# Patient Record
Sex: FEMALE | Race: WHITE | Employment: FULL TIME | URBAN - METROPOLITAN AREA
[De-identification: names, ages, dates, MRNs, and addresses within clinical notes are randomized per-mention and may not be internally consistent; named-entity substitution may affect disease eponyms.]

---

## 2022-06-24 NOTE — PROGRESS NOTES
Patient Name:  Deja Blas  Patient :  1960  Patient MRN:  [id number]     Primary Oncologist: Kristal Armando MD  Referring Provider: No primary care provider on file. Date of Service: 2022      Reason for Consult:  Anal cancer      Chief Complaint:  No chief complaint on file. There is no problem list on file for this patient. 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 She is a physician. She has history of LEEP procedure in  for +HPV pap smear, and been followed by Dr Soheila Garza. In  she has negative HIV testing. In 2022 she had discomfort to left lower abdomen. She saw Dr Temi Wilson and has anoscopy with removal of anal papilla on 2022. She was seen by Dr Juan David Christensen who recommended chemo and RT. We went over NCCN guideline and schedule for PET/MRI or PET CT scan. She will follow up with gyn for cervical exam and Pap smear. I offered xeloda and mitomycin vs 5-FU + mitomycin. Since mitomycin is strong vesicant agent, she is agreeable to have picc line. She does not have good peripheral access. I referred to IR for picc line and RT onc for concurrent chemo and RT. We discussed about potential SE of chemotherapy. I scheduled for chemo education. We went over surveillance. Mother has history of HR + breast cancer at 77. Patien is due for mammogram and I recommend yearly mammogram. Maternal aunt has ovarian cancer. We discussed about genetic counseling. Past Medical History:   Viral meningitis/encephalitis in . HPV + Papsmear s/p LEEP procedure in . Past Surgery History:    2006, 2008 and . Tonsillectomy in . Nasal fracture 1975  [place] teeth 2018. Anal cryptectomy/papillectomy 2022    Social History:   She denied smoking history. She drinks 2 oz of merlot at bed time occasionally for sleep.   She has 3 children. Family History: Mother has HR + breast cancer at 77 s/p lumpectomy then mastectomy, 3 years of tamoxifen, declined radiation, recurrent at 68 in spine, declined radiation and  at 67.l  Maternal aunt has ovarian cancer    Not on File    No current outpatient medications on file prior to visit. No current facility-administered medications on file prior to visit. Review of Systems:    Constitutional:  No weight loss, No fever, No chills, No night sweats. Energy level good. Eyes:  No diplopia, No transient or permanent loss of vision, No scotomata. ENT / Mouth:  No epistaxis, No dysphagia, No hoarseness, No oral ulcers, No gingival bleeding. No sore throat, No postnasal drip, No nasal drip, No mouth pain, No sinus pain, No tinnitus, Normal hearing. Cardiovascular:  No chest pain, No palpitations, No syncope, No upper extremity edema, No lower extremity edema, No calf discomfort. Respiratory:  No cough. No hemoptysis, No pleurisy, No wheezing, No dyspnea. Breast:  No breast mass, No pain, No nipple discharge, No change in size, No change in shape. Gastrointestinal:  No abdominal pain, No abdominal cramping, No nausea, No vomiting, No constipation, No diarrhea, No hematochezia, No melena, No jaundice, No dyspepsia, No dysphagia. Urinary:  No dysuria, No hematuria, No urinary incontinence. Gynecological:  No vaginal discharge, No suprapubic pain, No abnormal vaginal bleeding. (Female Patients Only)  Musculoskeletal:  No muscle pain, No swollen joints, No joint redness, No bone pain, No spine tenderness. Skin:  No rash, No nodules, No pruritus, No lesions. Neurologic:  No confusion, No seizures, No syncope, No tremor, No speech change, No headache, No hiccups, No abnormal gait, No sensory changes, No weakness. Psychiatric:  No depression, No anxiety, Concentration normal.  Endocrine:  No polyuria, No polydipsia, No hot flashes, No thyroid symptoms.   Hematologic:  No epistaxis, No gingival bleeding, No petechiae, No ecchymosis. Lymphatic:  No lymphadenopathy, No lymphedema. Allergy / Immunologic:  No eczema, No frequent mucous infections, No frequent respiratory infections, No recurrent urticarial, No frequent skin infections. Vital Signs: There were no vitals taken for this visit. Physical Exam:  CONSTITUTIONAL: awake, alert, cooperative, no apparent distress   EYES: pupils equal, round and reactive to light, sclera clear and conjunctiva normal  ENT: Normocephalic, without obvious abnormality, atraumatic  NECK: supple, symmetrical, no jugular venous distension and no carotid bruits   HEMATOLOGIC/LYMPHATIC: no cervical, supraclavicular or axillary lymphadenopathy   LUNGS: no increased work of breathing and clear to auscultation   CARDIOVASCULAR: regular rate and rhythm, normal S1 and S2, no murmur noted  ABDOMEN: normal bowel sounds x 4, soft, non-distended, non-tender, no masses palpated, no hepatosplenomegaly   MUSCULOSKELETAL: full range of motion noted, tone is normal  NEUROLOGIC: awake, alert, oriented to name, place and time. Motor skills grossly intact. SKIN: Normal skin color, texture, turgor and no jaundice. No ecchymosis.   EXTREMITIES: no LE edema      Labs:  Hematology:  No results found for: WBC, RBC, HGB, HCT, MCV, MCH, MCHC, RDW, PLT, MPV, BANDSPCT, SEGSPCT, EOSRELPCT, BASOPCT, LYMPHOPCT, MONOPCT, BANDABS, SEGSABS, EOSABS, BASOSABS, LYMPHSABS, MONOSABS, DIFFTYPE, ANISOCYTOSIS, POLYCHROM, WBCMORP, PLTM  No results found for: ESR    Chemistry:  No results found for: NA, K, CL, CO2, BUN, CREATININE, GLUCOSE, CALCIUM, PROT, LABALBU, BILITOT, ALKPHOS, AST, ALT, LABGLOM, GFRAA, AGRATIO, GLOB, PHOS, MG, POCCA, POCGLU  No results found for: MMA, LDH, HOMOCYSTEINE  No components found for: LD  No results found for: TSHHS, T4FREE, FT3    Immunology:  No results found for: PROT, SPEP, ALBUMINELP, LABALPH, LABBETA, GAMGLOB  No results found for: KAPPAUVOL, LAMBDAUVOL, KLFLCR  No results found for: B2M    Coagulation Panel:  No results found for: PROTIME, INR, APTT, DDIMER    Anemia Panel:  No results found for: IXUMGBPS03, FOLATE    Tumor Markers:  No results found for: , CEA, , LABCA2, PSA     Observations:  No data recorded     Assessment & Plan:  1. She has anal canal squamous cell carcinoma, HPV +. I ordered PET/MRI vs PET CT scan. I referred to RT onc and she opted to oral Xeloda and mitomcyin D1 and D 29. We discussed potential SE of chemo. We went over surveillance. I referred to IR of picc line placement. She does not have good peripheral access. 2. She will follow up with Dr Jo Pearson for pelvic exam.    3. She will see dermatologist for actinic keratosis to [place]. 4. I remind her about yearly mammogram.  We discussed about potential genetic counseling. RTC in 3 weeks or sooner. All of her questions have been answered for today. I have discussed the above stated plan with the patient and they verbalized understanding and agreed with the plan. Thank you for allowing us to participate in this patient's care.

## 2022-06-29 ENCOUNTER — INITIAL CONSULT (OUTPATIENT)
Dept: ONCOLOGY | Age: 62
End: 2022-06-29
Payer: OTHER GOVERNMENT

## 2022-06-29 ENCOUNTER — HOSPITAL ENCOUNTER (OUTPATIENT)
Dept: INFUSION THERAPY | Age: 62
Discharge: HOME | End: 2022-06-29
Payer: OTHER GOVERNMENT

## 2022-06-29 VITALS
WEIGHT: 161.2 LBS | HEART RATE: 51 BPM | BODY MASS INDEX: 25.3 KG/M2 | HEIGHT: 67 IN | SYSTOLIC BLOOD PRESSURE: 157 MMHG | OXYGEN SATURATION: 99 % | DIASTOLIC BLOOD PRESSURE: 85 MMHG | TEMPERATURE: 97.5 F

## 2022-06-29 DIAGNOSIS — C21.0 ANAL CANCER (HCC): ICD-10-CM

## 2022-06-29 DIAGNOSIS — C21.0 ANAL CANCER (HCC): Primary | ICD-10-CM

## 2022-06-29 LAB
ALBUMIN SERPL-MCNC: 4.6 GM/DL (ref 3.4–5)
ALP BLD-CCNC: 64 IU/L (ref 40–129)
ALT SERPL-CCNC: 15 U/L (ref 10–40)
ANION GAP SERPL CALCULATED.3IONS-SCNC: 9 MMOL/L (ref 4–16)
AST SERPL-CCNC: 20 IU/L (ref 15–37)
BASOPHILS ABSOLUTE: 0 K/CU MM
BASOPHILS RELATIVE PERCENT: 0.5 % (ref 0–1)
BILIRUB SERPL-MCNC: 0.2 MG/DL (ref 0–1)
BUN BLDV-MCNC: 20 MG/DL (ref 6–23)
CALCIUM SERPL-MCNC: 9.4 MG/DL (ref 8.3–10.6)
CHLORIDE BLD-SCNC: 103 MMOL/L (ref 99–110)
CO2: 26 MMOL/L (ref 21–32)
CREAT SERPL-MCNC: 0.7 MG/DL (ref 0.6–1.1)
DIFFERENTIAL TYPE: ABNORMAL
EOSINOPHILS ABSOLUTE: 0.1 K/CU MM
EOSINOPHILS RELATIVE PERCENT: 2.1 % (ref 0–3)
GFR AFRICAN AMERICAN: >60 ML/MIN/1.73M2
GFR NON-AFRICAN AMERICAN: >60 ML/MIN/1.73M2
GLUCOSE BLD-MCNC: 88 MG/DL (ref 70–99)
HCT VFR BLD CALC: 37.5 % (ref 37–47)
HEMOGLOBIN: 12.9 GM/DL (ref 12.5–16)
LYMPHOCYTES ABSOLUTE: 2.5 K/CU MM
LYMPHOCYTES RELATIVE PERCENT: 38.7 % (ref 24–44)
MCH RBC QN AUTO: 32.5 PG (ref 27–31)
MCHC RBC AUTO-ENTMCNC: 34.4 % (ref 32–36)
MCV RBC AUTO: 94.5 FL (ref 78–100)
MONOCYTES ABSOLUTE: 0.4 K/CU MM
MONOCYTES RELATIVE PERCENT: 6.6 % (ref 0–4)
PDW BLD-RTO: 12.5 % (ref 11.7–14.9)
PLATELET # BLD: 254 K/CU MM (ref 140–440)
PMV BLD AUTO: 10.1 FL (ref 7.5–11.1)
POTASSIUM SERPL-SCNC: 4.5 MMOL/L (ref 3.5–5.1)
RBC # BLD: 3.97 M/CU MM (ref 4.2–5.4)
SEGMENTED NEUTROPHILS ABSOLUTE COUNT: 3.4 K/CU MM
SEGMENTED NEUTROPHILS RELATIVE PERCENT: 52.1 % (ref 36–66)
SODIUM BLD-SCNC: 138 MMOL/L (ref 135–145)
TOTAL PROTEIN: 6.5 GM/DL (ref 6.4–8.2)
WBC # BLD: 6.5 K/CU MM (ref 4–10.5)

## 2022-06-29 PROCEDURE — 80053 COMPREHEN METABOLIC PANEL: CPT

## 2022-06-29 PROCEDURE — 99205 OFFICE O/P NEW HI 60 MIN: CPT | Performed by: INTERNAL MEDICINE

## 2022-06-29 PROCEDURE — 85025 COMPLETE CBC W/AUTO DIFF WBC: CPT

## 2022-06-29 PROCEDURE — 36415 COLL VENOUS BLD VENIPUNCTURE: CPT

## 2022-06-29 RX ORDER — CAPECITABINE 500 MG/1
825 TABLET, FILM COATED ORAL 2 TIMES DAILY
Qty: 210 TABLET | Refills: 0 | Status: ACTIVE | OUTPATIENT
Start: 2022-06-29 | End: 2022-08-03

## 2022-06-29 RX ORDER — NAPROXEN SODIUM 220 MG
440 TABLET ORAL 2 TIMES DAILY WITH MEALS
COMMUNITY

## 2022-06-29 RX ORDER — ACETAMINOPHEN 325 MG/1
650 TABLET ORAL EVERY 6 HOURS PRN
COMMUNITY

## 2022-06-29 ASSESSMENT — PATIENT HEALTH QUESTIONNAIRE - PHQ9
SUM OF ALL RESPONSES TO PHQ QUESTIONS 1-9: 0
SUM OF ALL RESPONSES TO PHQ QUESTIONS 1-9: 0
SUM OF ALL RESPONSES TO PHQ9 QUESTIONS 1 & 2: 0
1. LITTLE INTEREST OR PLEASURE IN DOING THINGS: 0
2. FEELING DOWN, DEPRESSED OR HOPELESS: 0
SUM OF ALL RESPONSES TO PHQ QUESTIONS 1-9: 0
SUM OF ALL RESPONSES TO PHQ QUESTIONS 1-9: 0

## 2022-06-29 NOTE — PROGRESS NOTES
[facility] Update    Date: 06/29/22      Patient's medication requires a prior authorization. Status update to follow as soon as possible. Please call us with any questions at [phone] opt.  2.

## 2022-06-29 NOTE — PROGRESS NOTES
MA Rooming Questions  Patient: Chad Morrison  MRN: [id number]    Date: 6/29/2022        NP    5. Did the patient have a depression screening completed today?  Yes    PHQ-9 Total Score: 0 (6/29/2022 10:36 AM)       PHQ-9 Given to (if applicable):               PHQ-9 Score (if applicable):                     [] Positive     [x]  Negative              Does question #9 need addressed (if applicable)                     [] Yes    []  No               Primitivo Presume, CMA

## 2022-06-30 ENCOUNTER — TELEPHONE (OUTPATIENT)
Dept: ONCOLOGY | Age: 62
End: 2022-06-30

## 2022-06-30 NOTE — TELEPHONE ENCOUNTER
Returned patients call at [phone] to provide appointment time and dates for PET and PICC placement. Patient states she rescheduled appointment a pawn viewing on [employer] to accommodate patients timeline. Patient stated concern related to leaving multiple voicemail's without return calls. Emotional support provided, call back policy reinforced. RT NN and NN were notified. Answered patients questions within my scope. More specialized questions remained. Advised patient to address these questions with RT NN and physician at consult. Patient voiced understanding. Denied further needs. Encouraged to call with further needs, given my direct number and NN number.

## 2022-06-30 NOTE — PROGRESS NOTES
Left message for patient to arrive at 0700 at [facility] on 7/5/2022 for her procedure at 0800. Spoke with Kaitlynn from Picc team and gave her patient information. ADDENUM: Just spoke with Shivani Law and she does not want to do the Picc Line on 7/5/2022, she is going to call central scheduling and try to get rescheduled on 7/11/2022. I called Picc team and spoke with Yojana Sharif to let them know.

## 2022-07-05 ENCOUNTER — TELEPHONE (OUTPATIENT)
Dept: RADIATION ONCOLOGY | Age: 62
End: 2022-07-05

## 2022-07-05 ENCOUNTER — HOSPITAL ENCOUNTER (OUTPATIENT)
Dept: SURGERY | Age: 62
Discharge: HOME | End: 2022-07-05

## 2022-07-06 ENCOUNTER — HOSPITAL ENCOUNTER (OUTPATIENT)
Dept: RADIATION ONCOLOGY | Age: 62
Discharge: HOME | End: 2022-07-06
Payer: OTHER GOVERNMENT

## 2022-07-06 VITALS
TEMPERATURE: 98.2 F | WEIGHT: 162.4 LBS | HEART RATE: 56 BPM | RESPIRATION RATE: 16 BRPM | DIASTOLIC BLOOD PRESSURE: 77 MMHG | BODY MASS INDEX: 25.49 KG/M2 | HEIGHT: 67 IN | SYSTOLIC BLOOD PRESSURE: 145 MMHG | OXYGEN SATURATION: 99 %

## 2022-07-06 DIAGNOSIS — C21.1 SQUAMOUS CELL CARCINOMA OF ANAL CANAL (HCC): ICD-10-CM

## 2022-07-06 PROCEDURE — 99205 OFFICE O/P NEW HI 60 MIN: CPT | Performed by: RADIOLOGY

## 2022-07-06 RX ORDER — OXYCODONE HYDROCHLORIDE AND ACETAMINOPHEN 5; 325 MG/1; MG/1
TABLET ORAL
COMMUNITY
Start: 2022-06-17 | End: 2022-07-06

## 2022-07-06 NOTE — PROGRESS NOTES
Elpidio Torrez  2022    CONSULT     Vitals:    22 0828   BP: (!) 145/77   Pulse: 56   Resp: 16   Temp: 98.2 °F (36.8 °C)   SpO2: 99%        Oxygen Therapy  SpO2: 99 %  Pulse Oximeter Device Mode: Intermittent  Pulse Oximeter Device Location: Right,Finger  O2 Device: None (Room air)    Wt Readings from Last 3 Encounters:   22 162 lb 6.4 oz (73.7 kg)   22 161 lb 3.2 oz (73.1 kg)           Denies Need for Intervention    Fall Risk:    Not currently a fall risk  Re-Evaluate Weekly    Nutritional Alteration:  None  No Difficulties Swallowing  Voices Sufficient Oral Intake    Mediport: no    Pacemaker/ICD: no    Implants: no    Previous XRT: no    Assessment: Patient here to discuss radiation treatment options with Dr. Pinky Conley.       Past Medical History:   Diagnosis Date    HPV in female 2019    Meningitis 2017       Past Surgical History:   Procedure Laterality Date    ANUS SURGERY  2022    cryptecteomy/papillectomy     SECTION  , ,     COLONOSCOPY  1995    NOSE SURGERY  1975    TONSILLECTOMY  1968    WISDOM TOOTH EXTRACTION  2018       Allergies   Allergen Reactions    Soy Anaphylaxis    Sulfamethoxazole-Trimethoprim Hives    Nitrofurantoin Rash          Current Outpatient Medications:     Ascorbic Acid (VITAMIN C PO), Take by mouth, Disp: , Rfl:     naproxen sodium (ALEVE) 220 MG tablet, Take 440 mg by mouth 2 times daily (with meals), Disp: , Rfl:     acetaminophen (TYLENOL) 325 MG tablet, Take 650 mg by mouth every 6 hours as needed for Pain, Disp: , Rfl:     capecitabine (XELODA) 500 MG chemo tablet, Take 3 tablets by mouth 2 times daily (Patient not taking: Reported on 2022), Disp: 210 tablet, Rfl: 0        Electronically signed by Gaby Leung CMA on 2022 at 8:30 AM

## 2022-07-06 NOTE — CONSULTS
Radiation Oncology Consultation  Encounter Date: 2022 8:29 AM    Ms. Josue Long is a 63 y.o. female  : 1960  MRN: [id number]  Acct Number: [de-identified]  Requesting Provider: No att. providers found        CONSULTANT: Sandy Barreto MD    PHYSICIANS:   Primary Care: No primary care provider on file. Lavinia Rodríguez M.D.  [address] [facility], [address] [facility]    [place],       DIAGNOSIS:      Cancer Staging  No matching staging information was found for the patient. TREATMENT COURSE:  Oncology History   Squamous cell carcinoma of anal canal ([address]   2022 Initial Diagnosis    Squamous cell carcinoma of anal canal (HCC)           HPI:   Today I had the privilege of seeing Josue Long in consultation. As you recall, Josue Long is a 63 y.o. female who was recently found to have an invasive squamous cell carcinoma of the anus. She was previously in a fairly good state of health, however, she reports that around January of this year, she began experiencing pain with defecation. She reports initially the pain was in the left lower pelvis however subsequently dropped to the perianal region rating a 10 on a scale of 1-10 with 10 being the worst.    She was subsequently referred to Dr. Rosaura Soriano, and On 2022, she underwent excision of what clinically appeared to be hypertrophied anal papilla. However, on pathologic review the specimen, she was noted to have invasive squamous cell carcinoma p16 positive. She was evaluated by Dr. Chuck Harden on 2022 at which time concurrent definitive chemo RT was recommended for local regional disease and PET/CT was scheduled. She presents today for consideration of her treatment options. She otherwise denies any acute symptomatology. She was not experiencing any fevers, chills, or drenching night sweats. Her weight and appetite have been stable. She has not noticed any new lumps or bumps anywhere throughout her body.   She denies new onset of bony pain. Currently, she reports she is healing from the surgery with the pain being up to an 8 on a scale of 1-10 with 10 being the worst but only very briefly and only immediately with bowel movements. She denies any melena or hematochezia. Past Medical History:   Diagnosis Date    HPV in female 2019    Meningitis 2017        Past Surgical History:   Procedure Laterality Date    ANUS SURGERY  2022    cryptecteomy/papillectomy     SECTION  , , 2008   [facility]GERY  [facility]   [facility]    WISDOM TOOTH EXTRACTION  2018       Family History   Problem Relation Age of Onset    Breast Cancer Mother     Cancer Sister        Social History     Socioeconomic History    Marital status:      Spouse name: Not on file    Number of children: Not on file    Years of education: Not on file    Highest education level: Not on file   Occupational History    Not on file   Tobacco Use    Smoking status: Never Smoker    Smokeless tobacco: Never Used   Substance and Sexual Activity    Alcohol use: Not on file    Drug use: Not on file    Sexual activity: Not on file   Other Topics Concern    Not on file   Social History Narrative    Not on file     Social Determinants of Health     Financial Resource Strain:     Difficulty of Paying Living Expenses: Not on file   Food Insecurity:     Worried About Running Out of Food in the Last Year: Not on file    Magi of Food in the Last Year: Not on file   Transportation Needs:     Lack of Transportation (Medical): Not on file    Lack of Transportation (Non-Medical):  Not on file   Physical Activity:     Days of Exercise per Week: Not on file    Minutes of Exercise per Session: Not on file   Stress:     Feeling of Stress : Not on file   Social Connections:     Frequency of Communication with Friends and Family: Not on file    Frequency of Social Gatherings with Friends and Family: Not on file    Attends Faith Services: Not on file    Active Member of Clubs or Organizations: Not on file    Attends Club or Organization Meetings: Not on file    Marital Status: Not on file   Intimate Partner Violence:     Fear of Current or Ex-Partner: Not on file    Emotionally Abused: Not on file    Physically Abused: Not on file    Sexually Abused: Not on file   Housing Stability:     Unable to Pay for Housing in the Last Year: Not on file    Number of [place] in the Last Year: Not on file    Unstable Housing in the Last Year: Not on file           ALLERGIES:   Allergies   Allergen Reactions    Soy Anaphylaxis    Sulfamethoxazole-Trimethoprim Hives    Nitrofurantoin Rash        Current Outpatient Medications   Medication Sig Dispense Refill    Ascorbic Acid (VITAMIN C PO) Take by mouth      naproxen sodium (ALEVE) 220 MG tablet Take 440 mg by mouth 2 times daily (with meals)      acetaminophen (TYLENOL) 325 MG tablet Take 650 mg by mouth every 6 hours as needed for Pain      capecitabine (XELODA) 500 MG chemo tablet Take 3 tablets by mouth 2 times daily (Patient not taking: Reported on 7/6/2022) 210 tablet 0     No current facility-administered medications for this encounter.      Outpatient Medications Marked as Taking for the 7/6/22 encounter [facility]ITAL Encounter) with [facility] ONC NURSE 2   Medication Sig Dispense Refill    Ascorbic Acid (VITAMIN C PO) Take by mouth            LABORATORY STUDIES:   CBC:   Lab Results   Component Value Date/Time    WBC 6.5 06/29/2022 01:07 PM    RBC 3.97 06/29/2022 01:07 PM    HGB 12.9 06/29/2022 01:07 PM    HCT 37.5 06/29/2022 01:07 PM    MCV 94.5 06/29/2022 01:07 PM    MCH 32.5 06/29/2022 01:07 PM    MCHC 34.4 06/29/2022 01:07 PM    RDW 12.5 06/29/2022 01:07 PM     06/29/2022 01:07 PM    MPV 10.1 06/29/2022 01:07 PM     CMP:  Lab Results   Component Value Date/Time     06/29/2022 01:07 PM    K 4.5 06/29/2022 01:07 PM     2022 01:07 PM    CO2 26 2022 01:07 PM    BUN 20 2022 01:07 PM    CREATININE 0.7 2022 01:07 PM    GFRAA >60 2022 01:07 PM    LABGLOM >60 2022 01:07 PM    GLUCOSE 88 2022 01:07 PM    PROT 6.5 2022 01:07 PM    LABALBU 4.6 2022 01:07 PM    CALCIUM 9.4 2022 01:07 PM    BILITOT 0.2 2022 01:07 PM    ALKPHOS 64 2022 01:07 PM    AST 20 2022 01:07 PM    ALT 15 2022 01:07 PM     Onc labs: No results found for: PSA, CEA, LDH, AFP    PATHOLOGY:   Invasive squamous cell carcinoma p16 positive    RADIOLOGIC STUDIES:   PET/CT 2022-scheduled      REVIEW OF SYSTEMS:   Constitutional:  Patient denies fevers, rigors, or drenching night sweats. The patient's weight and appetite have been stable over the past several months. Eyes:  The patient denies any recent visual changes, diplopia, or cataracts  ENMT:  The patient denies any ear pain, hearing changes, or nosebleeds  Respiratory:  The patient denies any SOB, hemoptysis, or green sputum production  Cardiovascular: The patient denies any chest pain, leg edema, or fainting spells  GI:  Patient denies nausea, vomiting, diarrhea, melena, or hematochezia  : Patient denies dysuria, hematuria, or urinary incontinence. Integumentary: patient denies skin pruritis, or arm edema. She does report nonhealing lesions in the proximal right upper extremity over the past 6 months which wax and wane, for which she is planning to see dermatology in the future. Endocrine:  Patient denies any diabetic or thyroid problems  Neurologic: Patient denies headaches, focal weakness, or disorientation  Psychiatric: The patient denies any depression, anxiety, or rapid mood swings. PHYSICAL EXAMINATION:   VITAL SIGNS: There were no vitals taken for this visit. ECOG PERFORMANCE STATUS: 0  PAIN RATIN    GENERAL: Pleasant well-developed adult in no acute distress.   Alert and oriented ×3  SKIN: Warm and dry, without jaundice, ecchymoses, or petechiae. HEENT: Normocephalic, atraumatic. Pupils are round and symmetric. Sclerae anicteric  NECK:  No JVD; Supple. No cervical, supraclavicular, or infraclavicular lymphadenopathy is palpable. LUNGS: Bilaterally clear to auscultation. No rales, rhonci, or wheezing are present. Good inspiratory effort, no accessory muscle use. CARDIAC: Regular rate and rhythm, without murmurs, clicks, or gallops   ABDOMEN: Normoactive bowels sounds are present. Soft. Non-tender and non-distended. No hepatosplenomegaly or masses are present. Digital rectal examination: Induration and edema is present along the left side of the anus immediately inside the sphincter. EXTREMITIES: No cyanosis, clubbing or edema is present. FROM  NEUROLOGIC: Gait unremarkable. The patient is alert and oriented. CN II-XII are grossly intact. Sensation to light touch is intact and symmetric in the fingers and feet. Muscle strength is 5/5 in both the upper and lower extremities. IMPRESSION/PLAN:  Elpidio Torrez is a 63 y.o. female who was recently found to have squamous cell carcinoma of the anal canal p16 positive. I will await her upcoming PET/CT results and review the images from 7/7/2022. Should the PET show no evidence of distant metastasis, the treatment options were discussed in detail with the patient. I would recommend definitive concurrent chemo-radiation therapy. The potential acute side effects and chronic complications of radiation therapy to the pelvis and anus were discussed in detail with the patient. The patient voiced understanding, and the patient's questions were answered. The patient has decided to proceed with radiation therapy, pending the above-mentioned result. I will await PET results prior to scheduling her simulation.     Additional physician time required for IMRT planning for a detailed contour and DVH evaluation to minimize acute and chronic toxicity of the bladder, rectum, and femoral heads. Thank-you for allowing me to participate in the care of this very pleasant patient. MEDICAL DECISION MAKING    Number/Complexity of Problems Addressed  [] 1 self-limited of minor problem (90291/17717)  [] >=2 self-limited or minor problems, 1 stable chronic illness, 1 acute/uncomplicated illness/injury (50045/12025)  []Chronic illnesses with exacerbation/progression/side effects of treatment, >=2 stable chronic illnesses, 1 undiagnosed new problem with uncertain prognosis, 1 acute illness with systemic symptoms, 1 acute complicated injury (05191/35978)  [x]Chronic illnesses with severe exacerbation/progression/treatment side effects, acute or chronic illness or injury that poses a threat to life or bodily function (74602/72465)    Amount and/or Complexity of Data Reviewed  [] Minimal or none (47020/84485)  [] Limited - meets 1/2 categories (18719/27003)  1. At least 2 of: review of prior external notes from each unique source, review results of each unique test, ordering of each unique test  2. Assessment requiring an independent historian  [] Moderate - meets 1/3 categories (40632/34904)  1. Any 3 of: Review of prior external notes from each unique source, review results of each unique test, ordering of each unique test, assessment requiring an independent historian  2. Independent interpretation of tests  3. Discussion of management or test interpretation  [x] Extensive - meets 2/3 categories (29451/95593)  1. Any 3 of: Review of prior external notes from each unique source, review results of each unique test, ordering of each unique test, assessment requiring an independent historian  2. Independent interpretation of tests  3.  Discussion of management or test interpretation    Risk of complications and/or morbidity/mortality of patient management  [] Minimal (37580/57536)  [] Low (10632/05437)  [] Moderate (04467/29241)  Examples: Rx drug management, decision regarding minor surgery with identified patient or procedure risk factors, decision regarding elective major surgery without identified patient or procedure risk factors, diagnosis or treatment significantly limited by social determinants of health  [x] High (51896/32917)  Examples: drug therapy requiring intensive monitoring for toxicity, decision regarding elective major surgery with identified patient/procedure risk factors, decision regarding emergency major surgery, decision regarding hospitalization, decision for DNR or de-escalation of care because of poor prognosis      LEVEL OF MDM (based on 2/3 above categories)  [] Straightforward (34980/72464)  [] Low (17747/52668)  [] Moderate (10991/53282)  [x] High (72007/44121)    Electronically signed by Ary Pagan MD on 7/6/2022 at 8:29 AM

## 2022-07-06 NOTE — PROGRESS NOTES
[facility]"[employer] [place]    Date: 07/06/22     Patient PA approved and script routed to mandated pharmacy [facility]. Patient was provided with the specialty pharmacy's phone number. Please call us with any questions at [phone] opt.  2.

## 2022-07-06 NOTE — PLAN OF CARE
Radiation education and handouts given. Side effects and management reviewed with pt. All questions answered and pt voices understanding . Nursing Care Plan for Pelvic Radiation    Pain related to cancer diagnosis and treatment. Interventions   Pain assessment. Monitor pharmacological pain medication. Teach relaxation and repositioning techniques. Expected Outcome   Maintain patient's acceptable pain level or <5 on pain scale. Knowledge deficit related to diagnosis and treatment plan. Interventions   Assess patient's ability to comprehend diagnosis and treatment plan. Provide educational materials and teaching regarding plan of care. Provide emotional support and continued education. Refer to psychosocial coordinator if further assistance needed. Expected Outcome   Patient voices understanding of diagnosis and treatment plan. Altered skin integrity related to treatment. Interventions   Evaluation of skin integrity at therapy site. Advise patient on appropriate skin care. Instruct patient on recommended lotions/ointments/creams/dressing changes to use on therapy site. Expected Outcome   Minimize adverse skin reaction/infection at treatment site. Altered genitourinary function. Interventions   Evaluate for treatment side effects. Evaluate treatment modalities for effectiveness. Monitor I & O. Expected Outcome   Patient with minimal urinary complications as evidenced by adequate urinary output. Gastrointestinal disturbances due to treatment process. Interventions   Evaluate for treatment side effects. Evaluate treatment modalities for effectiveness. Initiate and educate on appropriate bowel program if needed. Expected Outcome   Patient with minimal bowel complications and controlled management of side effects. Nurse to evaluate weekly during  radiation treatments.

## 2022-07-07 ENCOUNTER — HOSPITAL ENCOUNTER (OUTPATIENT)
Dept: PET IMAGING | Age: 62
Discharge: HOME | End: 2022-07-07
Payer: OTHER GOVERNMENT

## 2022-07-07 DIAGNOSIS — C21.0 ANAL CANCER (HCC): ICD-10-CM

## 2022-07-07 PROCEDURE — 2580000003 HC RX 258: Performed by: INTERNAL MEDICINE

## 2022-07-07 PROCEDURE — 78815 PET IMAGE W/CT SKULL-THIGH: CPT

## 2022-07-07 PROCEDURE — A9552 F18 FDG: HCPCS | Performed by: INTERNAL MEDICINE

## 2022-07-07 PROCEDURE — 3430000000 HC RX DIAGNOSTIC RADIOPHARMACEUTICAL: Performed by: INTERNAL MEDICINE

## 2022-07-07 RX ORDER — SODIUM CHLORIDE 0.9 % (FLUSH) 0.9 %
10 SYRINGE (ML) INJECTION PRN
Status: COMPLETED | OUTPATIENT
Start: 2022-07-07 | End: 2022-07-07

## 2022-07-07 RX ORDER — FLUDEOXYGLUCOSE F 18 200 MCI/ML
18.74 INJECTION, SOLUTION INTRAVENOUS
Status: COMPLETED | OUTPATIENT
Start: 2022-07-07 | End: 2022-07-07

## 2022-07-07 RX ADMIN — SODIUM CHLORIDE, PRESERVATIVE FREE 10 ML: 5 INJECTION INTRAVENOUS at 12:02

## 2022-07-07 RX ADMIN — FLUDEOXYGLUCOSE F 18 18.74 MILLICURIE: 200 INJECTION, SOLUTION INTRAVENOUS at 12:02

## 2022-07-07 NOTE — PROGRESS NOTES
.IR Procedure at [facility]: Spoke with patient and she will arrive at 0700 at [facility] on 7/11/2022 for her procedure at 0800. Went over below instructions, patient would like picc line nurse to give her a call. I will let picc team know. ADDENDUM: Benton Aquino from the picc team will call Dr Aparna Arroyo back. 1. NPO at Midnight     (Paracentesis, Thoracentesis, Thyroid Bx and Injections may have a light breakfast)  2. Follow your directions as prescribed by the doctor for your procedure and medications. 3.   Consult your provider as to when to stop blood thinner  4. Do not take any pain medication within 6 hours of your procedure  5. Do not drink any alcoholic beverages or use any street drugs 24 hours before procedure. 6.   Please wear simple, loose fitting clothing to the hospital.  Do not bring valuables (money,             credit cards, checkbooks, etc.)     7. If you  have a Living Will and Durable Power of  for Healthcare, please bring in a copy. 8.   Please bring picture ID,  insurance card, paperwork from the doctors office            (H & P, Consent,  & card for implantable devices). 9.   Report to the information desk on the ground floor. 10. Take a shower the night before or morning of your procedure, do not apply any lotion, oil or powder. 11. If you are going to be sedated for the procedure, you will need a responsible adult to drive you home.

## 2022-07-08 ENCOUNTER — CLINICAL DOCUMENTATION (OUTPATIENT)
Dept: ONCOLOGY | Age: 62
End: 2022-07-08

## 2022-07-08 DIAGNOSIS — C21.0 ANAL CANCER (HCC): Primary | ICD-10-CM

## 2022-07-08 NOTE — PROGRESS NOTES
Called patient @ [phone] to discuss tx plan. IR referral has been placed per physician order. Physician discussed options with Dr. oTmeka Fry regarding PICC, US guided PIV, and port. Patient supposed to have PICC placed on 07/11/22. Patient expresses concern d/t lesions on right arm and requesting PICC be placed in left arm. Patient scheduled for [facility] on 07/12/22. Xeloda has already been ordered. Tx regimen for mitomycin D1, 29 added by physician. Once approved, patient will be notified of tx dates and education appointments. Chemo will be concurrent with RT. Patient aware. This RN direct number provided.

## 2022-07-11 ENCOUNTER — HOSPITAL ENCOUNTER (OUTPATIENT)
Dept: SURGERY | Age: 62
Discharge: HOME | End: 2022-07-11

## 2022-07-11 DIAGNOSIS — C21.1 SQUAMOUS CELL CARCINOMA OF ANAL CANAL (HCC): Primary | ICD-10-CM

## 2022-07-11 NOTE — PROGRESS NOTES
Patient Name:  Brian Gaffney  Patient :  1960  Patient MRN:  [id number]     Primary Oncologist: Gauri Nance MD  Referring Provider: No primary care provider on file. Date of Service: 2022      Chief Complaint:    Chief Complaint   Patient presents with    Follow-up     She came in for follow up visit. There is no problem list on file for this patient. 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 She is a physician. She has history of LEEP procedure in  for +HPV pap smear, and been followed by Dr Damon Nageotte. In  she has negative HIV testing. In 2022 she had discomfort to left lower abdomen. She saw Dr Desire Ramon and has anoscopy with removal of anal papilla on 2022. She was seen by Dr Faviola Velasco who recommended chemo and RT. We went over NCCN guideline and schedule for PET/MRI or PET CT scan. She will follow up with gyn for cervical exam and Pap smear. I offered xeloda and mitomycin vs 5-FU + mitomycin. Since mitomycin is strong vesicant agent, she is agreeable to have picc line. She does not have good peripheral access. I referred to IR for picc line and RT onc for concurrent chemo and RT. We discussed about potential SE of chemotherapy. I scheduled for chemo education. We went over surveillance. Mother has history of HR + breast cancer at 77. Patien is due for mammogram and I recommend yearly mammogram. Maternal aunt has ovarian cancer. We discussed about genetic counseling. On 2022 she came in for follow up visit. CBC and CMP in 2022 grossly unremarkable. PET scan 2022: Only minimal anal uptake, suggesting postsurgical change. No metastatic disease. She requested MRI of pelvis study. She was seen by Dr Carolyn Ortiz who recommended RT for 28 - 30 days. I will reschedule chemo education. No acute pain.   Denied any nausea, vomiting or diarrhea. No fever or chills. No chest pain, shortness of breath or palpitation. No headache or dizzy spell. No specific bone pain. No melena or hematochezia. Denied any dysuria or hematuria. Past Medical History:   Viral meningitis/encephalitis in . HPV + Papsmear s/p LEEP procedure in . Past Surgery History:    ,  and . Tonsillectomy in 1968. Nasal fracture 1975  Cobb teeth . Anal cryptectomy/papillectomy 2022    Social History:   She denied smoking history. She drinks 2 oz of merlot at bed time occasionally for sleep. She has 3 children. Family History: Mother has HR + breast cancer at 77 s/p lumpectomy then mastectomy, 3 years of tamoxifen, declined radiation, recurrent at 68 in spine, declined radiation and  at 67.l  Maternal aunt has ovarian cancer    Review of Systems: The remainder of ROS is unremarkable. Vital Signs: BP (!) 155/83 (Site: Left Upper Arm, Position: Sitting, Cuff Size: Medium Adult)   Pulse 60   Temp 97.2 °F (36.2 °C) (Temporal)   Ht 5' 7\" (1.702 m)   Wt 161 lb 9.6 oz (73.3 kg)   SpO2 99%   BMI 25.31 kg/m²      Physical Exam:  CONSTITUTIONAL: awake, alert, cooperative, no apparent distress   EYES: pupils equal, round and reactive to light, sclera clear and conjunctiva normal  ENT: Normocephalic, without obvious abnormality, atraumatic  NECK: supple, symmetrical, no jugular venous distension and no carotid bruits   HEMATOLOGIC/LYMPHATIC: no cervical, supraclavicular or axillary lymphadenopathy   LUNGS: no increased work of breathing and clear to auscultation   CARDIOVASCULAR: regular rate and rhythm, normal S1 and S2, no murmur   ABDOMEN: normal bowel sound, soft, non-distended, non-tender, no masses palpated, no hepatosplenomegaly   MUSCULOSKELETAL: full range of motion noted, tone is normal  NEUROLOGIC: Motor skills grossly intact. CN II - XII grossly intact.   SKIN: Normal skin color, texture, turgor and no jaundice. No ecchymosis. EXTREMITIES: no LE edema or cyanosis. Labs:  Hematology:  Lab Results   Component Value Date    WBC 6.5 06/29/2022    RBC 3.97 (L) 06/29/2022    HGB 12.9 06/29/2022    HCT 37.5 06/29/2022    MCV 94.5 06/29/2022    MCH 32.5 (H) 06/29/2022    MCHC 34.4 06/29/2022    RDW 12.5 06/29/2022     06/29/2022    MPV 10.1 06/29/2022    SEGSPCT 52.1 06/29/2022    EOSRELPCT 2.1 06/29/2022    BASOPCT 0.5 06/29/2022    LYMPHOPCT 38.7 06/29/2022    MONOPCT 6.6 (H) 06/29/2022    SEGSABS 3.4 06/29/2022    EOSABS 0.1 06/29/2022    BASOSABS 0.0 06/29/2022    LYMPHSABS 2.5 06/29/2022    MONOSABS 0.4 06/29/2022    DIFFTYPE AUTOMATED DIFFERENTIAL 06/29/2022     Chemistry:  Lab Results   Component Value Date     06/29/2022    K 4.5 06/29/2022     06/29/2022    CO2 26 06/29/2022    BUN 20 06/29/2022    CREATININE 0.7 06/29/2022    GLUCOSE 88 06/29/2022    CALCIUM 9.4 06/29/2022    PROT 6.5 06/29/2022    LABALBU 4.6 06/29/2022    BILITOT 0.2 06/29/2022    ALKPHOS 64 06/29/2022    AST 20 06/29/2022    ALT 15 06/29/2022    LABGLOM >60 06/29/2022    GFRAA >60 06/29/2022     Immunology:  Lab Results   Component Value Date    PROT 6.5 06/29/2022      Observations:  No data recorded     Assessment & Plan:  1. She has anal canal squamous cell carcinoma, HPV +. PET scan was reviewed as above. She opted to oral Xeloda and mitomcyin D1 and D 29. She has seen Dr Jada Alarcon, RT onc. She will see IR, Dr Tomeka Fry for IJ access on day of chemotherapy. I will schedule chemo education and order MRI of pelvis. 2. She will follow up with Dr Amanda Amaro for pelvic exam.    3. She will see dermatologist for actinic keratosis to RUE. 4. I remind her about yearly mammogram.  We discussed about potential genetic counseling. RTC in 3 weeks or sooner. All of her questions have been answered for today. I have discussed the above stated plan with the patient and they verbalized understanding and agreed with the plan.

## 2022-07-11 NOTE — PROGRESS NOTES
Received VM from patient and called patient back @ [phone] per request. Patient was supposed to have PICC placed today 07/11/22, but IR recommending external jugular PIV for administration of vesicant drug: mitomycin (to be inserted and removed on D1 and D29 of tx). Physician aware and agreeable. EJ PIV will need PA. Order for IR referral placed.

## 2022-07-12 ENCOUNTER — HOSPITAL ENCOUNTER (OUTPATIENT)
Dept: RADIATION ONCOLOGY | Age: 62
Discharge: HOME | End: 2022-07-12
Payer: OTHER GOVERNMENT

## 2022-07-12 PROCEDURE — 77334 RADIATION TREATMENT AID(S): CPT | Performed by: RADIOLOGY

## 2022-07-12 PROCEDURE — 77470 SPECIAL RADIATION TREATMENT: CPT | Performed by: RADIOLOGY

## 2022-07-12 PROCEDURE — 77332 RADIATION TREATMENT AID(S): CPT | Performed by: RADIOLOGY

## 2022-07-12 PROCEDURE — 77290 THER RAD SIMULAJ FIELD CPLX: CPT | Performed by: RADIOLOGY

## 2022-07-12 PROCEDURE — 77263 THER RADIOLOGY TX PLNG CPLX: CPT | Performed by: RADIOLOGY

## 2022-07-14 ENCOUNTER — CLINICAL DOCUMENTATION (OUTPATIENT)
Dept: ONCOLOGY | Age: 62
End: 2022-07-14

## 2022-07-14 NOTE — PROGRESS NOTES
RT dates 07/27/22 through 09/06/22. Called [facility] @ [phone] and spoke with [facility] to inquire about medication status. RX verified. Patient to take capecitabine (xeloda) 500 mg as follows: Take (3) tabs by mouth BID concurrent with RT Monday through Friday. QTY: 180. [facility] states that they will reach out to patient and provide number to set up delivery since medication will be filled at another location.

## 2022-07-15 ENCOUNTER — CLINICAL DOCUMENTATION (OUTPATIENT)
Dept: ONCOLOGY | Age: 62
End: 2022-07-15

## 2022-07-15 NOTE — PROGRESS NOTES
Spoke with patient regarding oral medication and EJ PIV procedure. Pharmacy should be reaching out to patient to provide number to arrange home delivery for capecitabine. Will follow up with IR next week and keep patient updated regarding procedure. Patient's first RT scheduled for 07/27/22 @ 1230. Patient to have mitomycin D1 & D29. Patient states she faxed a letter for rad onc physician with several questions. Patient states she called earlier today to confirm fax was received. Denies further needs at this time.

## 2022-07-19 ENCOUNTER — HOSPITAL ENCOUNTER (OUTPATIENT)
Dept: RADIATION ONCOLOGY | Age: 62
Discharge: HOME | End: 2022-07-19
Payer: OTHER GOVERNMENT

## 2022-07-19 VITALS
HEART RATE: 70 BPM | HEIGHT: 67 IN | BODY MASS INDEX: 24.99 KG/M2 | DIASTOLIC BLOOD PRESSURE: 104 MMHG | WEIGHT: 159.2 LBS | TEMPERATURE: 98 F | OXYGEN SATURATION: 98 % | SYSTOLIC BLOOD PRESSURE: 179 MMHG | RESPIRATION RATE: 16 BRPM

## 2022-07-19 DIAGNOSIS — C21.1 SQUAMOUS CELL CARCINOMA OF ANAL CANAL (HCC): Primary | ICD-10-CM

## 2022-07-19 PROCEDURE — 99211 OFF/OP EST MAY X REQ PHY/QHP: CPT | Performed by: RADIOLOGY

## 2022-07-19 RX ORDER — CLOBETASOL PROPIONATE 0.5 MG/G
CREAM TOPICAL
COMMUNITY
Start: 2022-07-14

## 2022-07-19 NOTE — PROGRESS NOTES
Radiation Progress Note    DATE OF SERVICE: 7/19/2022     DIAGNOSIS:  Cancer Staging  Squamous cell carcinoma of anal canal ([facility] [address]  Staging form: Anus, AJCC 8th Edition  - Clinical stage from 6/17/2022: Stage I (cT1, cN0, cM0) - Signed by Wolf Burnette MD on 7/6/2022       TREATMENT COURSE:   Oncology History   Squamous cell carcinoma of anal canal ([facility] [address]   6/17/2022 -  Cancer Staged    Staging form: Anus, AJCC 8th Edition  - Clinical stage from 6/17/2022: Stage I (cT1, cN0, cM0)       7/6/2022 Initial Diagnosis    Squamous cell carcinoma of anal canal (HCC)     7/18/2022 -  Chemotherapy    OP mitoMYcin + fluorouracil D1-5  Plan Provider: Gauri Nance MD  Treatment goal: Curative  Line of treatment: Induction             Site: Anal cancer    EXAM  Wt Readings from Last 3 Encounters:   07/19/22 159 lb 3.2 oz (72.2 kg)   07/06/22 162 lb 6.4 oz (73.7 kg)   06/29/22 161 lb 3.2 oz (73.1 kg)     Patient with many questions regarding details of radiation techniques and dosing as well as issues regarding staging. Patient's questions were answered to her satisfaction. She has decided to proceed as scheduled.     Electronically signed by Wolf Burnette MD on 7/19/2022 at 9:24 AM

## 2022-07-19 NOTE — PROGRESS NOTES
Melissa Cloud  7/19/2022    Patient is seen today for follow up. Vitals:    07/19/22 0833   BP: (!) 179/104   Pulse: 70   Resp: 16   Temp: 98 °F (36.7 °C)   SpO2: 98%        Oxygen Therapy  SpO2: 98 %  Pulse Oximetry Type: Continuous  Pulse Oximeter Device Mode: Intermittent  O2 Device: None (Room air)    Wt Readings from Last 3 Encounters:   07/19/22 159 lb 3.2 oz (72.2 kg)   07/06/22 162 lb 6.4 oz (73.7 kg)   06/29/22 161 lb 3.2 oz (73.1 kg)          Denies Need for Intervention       Allergies   Allergen Reactions    Soy Anaphylaxis    Sulfamethoxazole-Trimethoprim Hives    Nitrofurantoin Rash        Current Outpatient Medications   Medication Sig Dispense Refill    clobetasol (TEMOVATE) 0.05 % cream       Ascorbic Acid (VITAMIN C PO) Take by mouth      naproxen sodium (ANAPROX) 220 MG tablet Take 440 mg by mouth 2 times daily (with meals)      acetaminophen (TYLENOL) 325 MG tablet Take 650 mg by mouth every 6 hours as needed for Pain      capecitabine (XELODA) 500 MG chemo tablet Take 3 tablets by mouth 2 times daily (Patient not taking: No sig reported) 210 tablet 0     No current facility-administered medications for this encounter. Additional Comments: Patient here for a follow up with Dr. Carson Mayer.     Electronically signed by Shelby Mcneil on 7/19/2022 at 8:37 AM

## 2022-07-20 PROCEDURE — 77338 DESIGN MLC DEVICE FOR IMRT: CPT | Performed by: RADIOLOGY

## 2022-07-20 PROCEDURE — 77301 RADIOTHERAPY DOSE PLAN IMRT: CPT | Performed by: RADIOLOGY

## 2022-07-20 PROCEDURE — 77300 RADIATION THERAPY DOSE PLAN: CPT | Performed by: RADIOLOGY

## 2022-07-22 ENCOUNTER — HOSPITAL ENCOUNTER (OUTPATIENT)
Dept: INFUSION THERAPY | Age: 62
End: 2022-07-22

## 2022-07-22 ENCOUNTER — TELEPHONE (OUTPATIENT)
Dept: INFUSION THERAPY | Age: 62
End: 2022-07-22

## 2022-07-22 ENCOUNTER — OFFICE VISIT (OUTPATIENT)
Dept: ONCOLOGY | Age: 62
End: 2022-07-22
Payer: OTHER GOVERNMENT

## 2022-07-22 VITALS
DIASTOLIC BLOOD PRESSURE: 83 MMHG | SYSTOLIC BLOOD PRESSURE: 155 MMHG | WEIGHT: 161.6 LBS | HEART RATE: 60 BPM | BODY MASS INDEX: 25.36 KG/M2 | OXYGEN SATURATION: 99 % | TEMPERATURE: 97.2 F | HEIGHT: 67 IN

## 2022-07-22 DIAGNOSIS — C21.0 ANAL CANCER (HCC): Primary | ICD-10-CM

## 2022-07-22 PROCEDURE — 99213 OFFICE O/P EST LOW 20 MIN: CPT | Performed by: INTERNAL MEDICINE

## 2022-07-22 NOTE — PROGRESS NOTES
MA Rooming Questions  Patient: Georgina Painting  MRN: [id number]    Date: 7/22/2022        1. Do you have any new issues?   no         2. Do you need any refills on medications?    no    3. Have you had any imaging done since your last visit? yes - labs 6/29 pet 7/7    4. Have you been hospitalized or seen in the emergency room since your last visit here?   no    5. Did the patient have a depression screening completed today?  No    No data recorded     PHQ-9 Given to (if applicable):               PHQ-9 Score (if applicable):                     [] Positive     []  Negative              Does question #9 need addressed (if applicable)                     [] Yes    []  No               Kwabena Barker CMA

## 2022-07-22 NOTE — TELEPHONE ENCOUNTER
Pt called spoke to MA stating her MRI is STAT. MA routed message to me, I followed up with Dr. Jameson Shelby. He stated he is agreeable with making order STAT but did not originally. Skyler wanted to wait for Auth to be done before scheduling, because prior PET was denied. I returned call to PT stating we could make order STAT but it will not guarantee AUTH. Pt agreed to wait for Moustapha Luu, but wanted to make sure appointments were cancelled for RADIATION and CHEMO till results are back. I made staff aware of changes. Left number [phone] direct line for anymore questions or concerns.

## 2022-07-27 ENCOUNTER — APPOINTMENT (OUTPATIENT)
Dept: RADIATION ONCOLOGY | Age: 62
End: 2022-07-27
Payer: OTHER GOVERNMENT

## 2022-07-28 ENCOUNTER — APPOINTMENT (OUTPATIENT)
Dept: RADIATION ONCOLOGY | Age: 62
End: 2022-07-28
Payer: OTHER GOVERNMENT

## 2022-07-29 ENCOUNTER — APPOINTMENT (OUTPATIENT)
Dept: RADIATION ONCOLOGY | Age: 62
End: 2022-07-29
Payer: OTHER GOVERNMENT

## 2022-08-01 ENCOUNTER — CLINICAL DOCUMENTATION (OUTPATIENT)
Dept: ONCOLOGY | Age: 62
End: 2022-08-01

## 2022-08-01 NOTE — PROGRESS NOTES
Patient rad onc records were faxed to [facility] per patient's request for continuity of care.